# Patient Record
Sex: FEMALE | URBAN - METROPOLITAN AREA
[De-identification: names, ages, dates, MRNs, and addresses within clinical notes are randomized per-mention and may not be internally consistent; named-entity substitution may affect disease eponyms.]

---

## 2021-03-24 ENCOUNTER — OFFICE VISIT CONVERTED (OUTPATIENT)
Dept: NEUROLOGY | Facility: CLINIC | Age: 81
End: 2021-03-24
Attending: PSYCHIATRY & NEUROLOGY

## 2021-05-10 NOTE — H&P
History and Physical      Patient Name: Joanne Scott   Patient ID: [id number]   Sex: Female   YOB: 1940    Referring Provider: David MCCLENDON    Visit Date: March 24, 2021    Provider: Graeme Powell MD   Location: [facility]   Location Address: [address] [place], [place] [address], [place]  [id number]   Location Phone: [phone]          Chief Complaint     New patient presented to us today for CVA, pt had imaging done at [facility] on 02/24/2021.       History Of Present Illness  Joanne Scott is a 81 year old female who presents today to [facility] today referred from David MCCLENDON.      81-year-old woman evaluated for left posterior cerebral artery distribution infarction.  1 month ago she was dizzy and lost her balance and loss her short-term memory.  She lost vision on her right side which was not as clear.  It lasted for 1 to 2 weeks.  They made her go to the hospital 2 days after onset of symptoms and she was told that she had atrial fibrillation.  She was started on Eliquis.  She had an echocardiogram as well as carotid ultrasound.  Carotid ultrasound was unremarkable.  She is supposed to see a cardiologist this morning in [place].  She has been staying with her son-in-law and daughter.  She stopped driving a month ago.  She is independent otherwise with activities of daily living.  She does not use a cane or a walker at home however she has had hip problems and knee problems in the past.    She is presently on Eliquis, atorvastatin, baby aspirin, Metformin.    I reviewed MRI of the brain images with her son-in-law in the computer.       Past Medical History  Diabetes; High cholesterol; Hypertension; Muscle cramp; Stroke         Past Surgical History  I have had no surgeries         Medication List  aspirin 81 mg oral tablet,chewable; Eliquis 5 mg oral tablet; lisinopril 10 mg oral tablet; metformin 500 mg oral tablet;  "simvastatin 20 mg oral tablet         Allergy List  NO KNOWN DRUG ALLERGIES       Allergies Reconciled  Family Medical History  Cancer, Unspecified; Diabetes         Social History  lives alone; Retired; Tobacco (Never)         Review of Systems  · Constitutional  o Denies  o : chills, excessive sweating, fatigue, fever, sycope/passing out, weight gain, weight loss  · Eyes  o Admits  o : changes in vision, blurred vision  o Denies  o : double vision  · HENT  o Admits  o : sinus pain  o Denies  o : hearing loss, ringing in the ears, ear aches, sore throat, nasal congestion, nose bleeds, seasonal allergies  · Cardiovascular  o Denies  o : blood clots, swollen legs, anemia, easy burising or bleeding, transfusions  · Respiratory  o Denies  o : shortness of breath, dry cough, productive cough, pneumonia, COPD  · Gastrointestinal  o Denies  o : dysphagia, reflux  · Genitourinary  o Admits  o : incontinence  · Neurologic  o Admits  o : headache, stroke, loss of balance, dizziness/vertigo  o Denies  o : seizure, tremor, falls, difficulty with sleep, numbness/tingling/paresthesia , difficulty with coordination, difficulty with dexterity, weakness  · Musculoskeletal  o Admits  o : joint pain  o Denies  o : neck stiffness/pain, swollen lymph nodes, muscle aches, weakness, spasms, sciatica, pain radiating in arm, pain radiating in leg, low back pain  · Endocrine  o Denies  o : diabetes, thyroid disorder  · Psychiatric  o Denies  o : anxiety, depression      Vitals  Date Time BP Position Site L\R Cuff Size HR RR TEMP (F) WT  HT  BMI kg/m2 BSA m2 O2 Sat FR L/min FiO2        03/24/2021 09:38 /92 Sitting    99 - R   171lbs 8oz 5'  3\" 30.38 1.86             Physical Examination     She is alert, fluent, phasic, follows commands well.  Her immediate memory is 3 out of 3, recent memory 0 out of 3.  She is fully oriented to time and place.  Naming is intact.  Following three-step commands is intact.  She is able to spell her " name backwards.  She is unable to tell me the president of [place] at this time.  She told me that she does not like him and she does not like his haircut.  She was talking about Trump.  Heart is irregular in rhythm with periods of regular rhythm.  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  Otherwise normal visual fields.  EOMs full directions of gaze, facial strength is full, soft palate elevation and tongue are normal.  There is no weakness of the upper or lower extremities and with muscle testing.  Fine finger movements are intact.  Reflexes are symmetrical in biceps, triceps, patellar's decrease in ankles.  Cerebellar testing static.  Station gait she has difficulty walking without a walker because of her orthopedic gait.  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  Her gait is unsteady without a walker.           Assessment  · Stroke     434.91/I63.9  She has a left posterior cerebral artery distribution infarction secondary atrial fibrillation. From neurologic point of view she does not need baby aspirin. They are to ask her cardiologist whether or not she needs to be on low-dose aspirin. I would recommend for her atorvastatin to be increased to 40 mg daily. I explained to the son in law regarding symptoms to look for that would require for her to be brought to the hospital for possible thrombectomy if she is a candidate. She is not going to be a candidate for TPA because of her Eliquis.    I advised her that she should not be driving. Her daughter and son-in-law is going to make decisions regarding her living arrangements. I did advise them that she needs assistance secondary to her memory loss and her gait abnormalities increasing her risk for falls.    I will see her in the future as needed. Total time spent with patient and coordinating patient care was 45 minutes      Plan  · Medications  o Medications have been Reconciled  o Transition of Care or Provider  Policy  · Instructions  o Encouraged to follow-up with Primary Care Provider for preventative care.            Electronically Signed by: Graeme Powell MD -Author on March 24, 2021 11:05:01 AM

## 2021-05-14 VITALS
BODY MASS INDEX: 30.39 KG/M2 | HEART RATE: 99 BPM | WEIGHT: 171.5 LBS | SYSTOLIC BLOOD PRESSURE: 165 MMHG | DIASTOLIC BLOOD PRESSURE: 92 MMHG | HEIGHT: 63 IN